# Patient Record
Sex: FEMALE | URBAN - METROPOLITAN AREA
[De-identification: names, ages, dates, MRNs, and addresses within clinical notes are randomized per-mention and may not be internally consistent; named-entity substitution may affect disease eponyms.]

---

## 2022-06-23 DIAGNOSIS — Z12.11 SCREENING FOR COLON CANCER: Primary | ICD-10-CM

## 2022-10-20 ENCOUNTER — TELEPHONE (OUTPATIENT)
Dept: GASTROENTEROLOGY | Facility: CLINIC | Age: 62
End: 2022-10-20
Payer: COMMERCIAL

## 2022-10-20 NOTE — TELEPHONE ENCOUNTER
Reached out to pt to schedule colon. Home # disconnected. LVM on cell # for pt to contact our office to schedule and update chart - VL

## 2022-10-20 NOTE — TELEPHONE ENCOUNTER
----- Message from Molly Atkins sent at 2022  7:28 AM CDT -----  Regardinnd referral recd 08.15.22, 1st referral recd 22  Direct schedule - New pt NBP - last colon  RAR (old chrt 2010-pg 6)    2nd referral recd 08.15.22, 1st referral recd 22    Please call pt to schedule.      Thanks,  Molly